# Patient Record
(demographics unavailable — no encounter records)

---

## 2019-12-09 NOTE — EDM.PDOC
ED HPI GENERAL MEDICAL PROBLEM





- General


Chief Complaint: General


Stated Complaint: dizzy


Time Seen by Provider: 12/09/19 07:58


Source of Information: Reports: Patient


History Limitations: Reports: No Limitations





- History of Present Illness


INITIAL COMMENTS - FREE TEXT/NARRATIVE: 





This patient is a 65 year old female that presents to the ER. The patient 

reports that at 3am this morning she got up from bed to walk to the bathroom 

and became dizzy and fell to the floor. Patient denies hitting her head, loc, n

, v, vision changes or injury from fall. Patient reports she laid there for 

about 15 minutes then got up to go to kitchen to get a drink of water. She 

reports then becoming dizzy again. She reports she did not fall at that time. 

Patient reports that her dizziness is worse with movement or when she gets up. 

She reports it feels like the room or her are spinning. The patient also 

reports that she only takes multivitamins. She reports no hx of diabetes or 

cardiac history. She reports that she did get a mammogram in April 2019 in 

[place] where she was living. She reports that the Mammogram in April showed a 

mass in her right breast that needed surgery. The patient reports she did not 

want to have surgery and wanted a second opinion. She reports in August having 

another mammogram and again showed Mass in right breast in [place]. Patient 

reports she still wanted to have a second opinion. She now lives in [place]. She 

reports that she is waiting for MRI and appointment in [place] for this breast 

mass. She reports they are waiting on medical records from [place] at [facility]. The patient also reports now for several weeks, maybe months having 

a dry cough. She reports for weeks her lungs have hurt, hurt worse with 

coughing. The patient is a CNA. The patient is alert and oriented. She has no 

unilateral weaknesses. 


Onset: Today


Onset Date: 12/09/19


Onset Time: 03:30


Severity: Moderate


Worsens with: Reports: Movement


Associated Symptoms: Reports: Cough, Malaise, Weakness (generally).  Denies: 

Confusion, Chest Pain, cough w sputum, Diaphoresis, Fever/Chills, Headaches, 

Loss of Appetite, Nausea/Vomiting, Rash, Seizure, Shortness of Breath, Syncope


  ** Chest


Pain Score (Numeric/FACES): 6





- Related Data


 Allergies











Allergy/AdvReac Type Severity Reaction Status Date / Time


 


Penicillins Allergy  Cannot Verified 12/09/19 08:03





   Remember  











Home Meds: 


 Home Meds





Multivitamin [Multi-Vitamin Daily] 1 tab PO DAILY 12/09/19 [History]


Omega-3S/DHA/Epa/Fish Oil [Fish Oil Omega-3 Softgel] 1 cap PO DAILY 12/09/19 [

History]











ED ROS GENERAL





- Review of Systems


Review Of Systems: See Below


Constitutional: Reports: Malaise, Weakness (generally)


HEENT: Reports: No Symptoms.  Denies: Rhinitis, Sinus Problem


Respiratory: Reports: Pleuritic Chest Pain ("lungs hurt"), Cough.  Denies: 

Shortness of Breath, Sputum


Cardiovascular: Denies: Edema, Palpitations, Syncope


Endocrine: Reports: No Symptoms


GI/Abdominal: Denies: Abdominal Pain, Black Stool, Bloody Stool, Constipation, 

Diarrhea, Distension, Nausea, Vomiting


: Reports: No Symptoms.  Denies: Dysuria, Flank Pain


Musculoskeletal: Reports: No Symptoms


Skin: Reports: No Symptoms


Neurological: Reports: Dizziness.  Denies: Confusion, Headache, Numbness, 

Tingling, Tremors, Trouble Speaking, Weakness (no unilateral weaknesses), 

Change in Speech


Psychiatric: Reports: Anxiety ("I have a lot going on")


Hematologic/Lymphatic: Reports: No Symptoms


Immunologic: Reports: No Symptoms





ED EXAM, GENERAL





- Physical Exam


Exam: See Below


Exam Limited By: No Limitations


General Appearance: Alert, WD/WN, No Apparent Distress, Anxious, Obese


Eye Exam: Bilateral Eye: EOMI, Normal Inspection, PERRL


Ears: Normal External Exam, Normal Canal, Hearing Grossly Normal, Normal TMs


Ear Exam: Bilateral Ear: Auricle Normal, Canal Normal, TM normal


Nose: Normal Inspection, Normal Mucosa, No Blood


Throat/Mouth: Normal Inspection, Normal Lips, Normal Teeth, Normal Gums, Normal 

Oropharynx, Normal Voice, No Airway Compromise


Head: Atraumatic, Normocephalic


Neck: Normal Inspection, Supple, Non-Tender, Full Range of Motion


Respiratory/Chest: No Respiratory Distress, Lungs Clear, Normal Breath Sounds, 

No Accessory Muscle Use, Chest Non-Tender


Cardiovascular: Normal Peripheral Pulses, No Edema, No Gallop, No JVD, No Murmur

, No Rub, Tachycardia (90 when laying down, then went to 104 on exam when 

sitting up. Patient did become symptomatic dizzy with sitting up. )


Peripheral Pulses: 2+: Radial (L), Radial (R), Posterior Tibial (L), Posterior 

Tibial (R)


GI/Abdominal: Normal Bowel Sounds, Soft, Non-Tender, No Organomegaly, No 

Distention, No Abnormal Bruit, No Mass, Pelvis Stable


 (Female) Exam: Deferred


Rectal (Female) Exam: Deferred


Back Exam: Normal Inspection, Full Range of Motion


Extremities: Normal Inspection, Normal Range of Motion, Non-Tender, No Pedal 

Edema, Normal Capillary Refill


Neurological: Alert, Oriented, CN II-XII Intact, Normal Cognition, No Motor/

Sensory Deficits, Other (STROKE SCORE:0     GCS:15)


Psychiatric: Anxious, Tearful


Skin Exam: Warm, Dry, Intact, Normal Color, No Rash


Lymphatic: No Adenopathy





Course





- Vital Signs


Last Recorded V/S: 


 Last Vital Signs











Temp  97.9 F   12/09/19 07:55


 


Pulse  101 H  12/09/19 07:55


 


Resp  16   12/09/19 07:55


 


BP  130/81   12/09/19 07:55


 


Pulse Ox  97   12/09/19 07:55








 





Orthostatic Blood Pressure [     131/91


Standing]                        


Orthostatic Blood Pressure [     152/103


Sitting]                         


Orthostatic Blood Pressure [     134/77


Supine]                          











- Orders/Labs/Meds


Orders: 


 Active Orders 24 hr











 Category Date Time Status


 


 EKG Documentation Completion [RC] STAT Care  12/09/19 08:09 Active


 


 Orthostatic Vital Signs [RC] ASDIRECTED Care  12/09/19 08:07 Active


 


 Chest 2V [CR] Stat Exams  12/09/19 08:07 Taken


 


 CULTURE BLOOD [BC] Stat Lab  12/09/19 08:15 Received


 


 CULTURE BLOOD [BC] Stat Lab  12/09/19 08:32 Received


 


 CULTURE URINE [RM] Stat Lab  12/09/19 08:50 Received


 


 Blood Culture x2 Reflex Set [OM.PC] Stat Oth  12/09/19 08:07 Ordered











Labs: 


 Laboratory Tests











  12/09/19 12/09/19 12/09/19 Range/Units





  08:15 08:15 08:15 


 


WBC  8.4    (5.0-10.0)  10^3/uL


 


RBC  4.57    (4.00-5.50)  10^6/uL


 


Hgb  14.2    (12.0-16.0)  g/dL


 


Hct  41.5    (37.0-47.0)  %


 


MCV  90.8    (82.0-94.0)  fL


 


MCH  31.1    (27.0-32.0)  pg


 


MCHC  34.2    (33.0-38.0)  g/dL


 


RDW Coeff of Madisyn  12.6    (11.0-15.0)  %


 


Plt Count  247    (150-400)  10^3/uL


 


Neut % (Auto)  67.9    (35-85)  %


 


Lymph % (Auto)  18.1    (10-55)  %


 


Mono % (Auto)  10.0    (0-16)  %


 


Eos % (Auto)  3.2    (0-5)  %


 


Baso % (Auto)  0.8    (0-3)  %


 


Neut # (Auto)  5.68    (1.80-7.00)  10^3/uL


 


Lymph # (Auto)  1.52    (1.00-4.80)  10^3/uL


 


Mono # (Auto)  0.84 H    (0.00-0.80)  10^3/uL


 


Eos # (Auto)  0.27    (0.00-0.45)  10^3/uL


 


Baso # (Auto)  0.07    10^3/uL


 


D-Dimer, Quantitative     (0.00-0.50)  


 


Sodium   141   (136-145)  mEq/L


 


Potassium   4.1   (3.5-5.0)  mEq/L


 


Chloride   106   ()  mEq/L


 


Carbon Dioxide   23   (21-32)  mmol/L


 


BUN   11   (7-18)  mg/dL


 


Creatinine   0.9   (0.6-1.0)  mg/dL


 


Est Cr Clr Drug Dosing   47.03   mL/min


 


Estimated GFR (MDRD)   > 60   (>=60)  mL/min


 


Glucose   207 H   (75-99)  mg/dL


 


Lactic Acid    1.7  (0.4-2.0)  mmol/L


 


Calcium   9.1   (8.4-10.1)  mg/dL


 


Total Bilirubin   0.5   (0.0-1.0)  mg/dL


 


AST   42 H   (15-37)  U/L


 


ALT   107 H   (12-78)  U/L


 


Alkaline Phosphatase   163 H   ()  U/L


 


Creatine Kinase   73   ()  U/L


 


Troponin I   < 0.017   (0.00-0.06)  ng/mL


 


Total Protein   7.3   (6.4-8.2)  g/dL


 


Albumin   3.8   (3.4-5.0)  g/dL


 


Urine Color     (YELLOW)  


 


Urine Appearance     (CLEAR)  


 


Urine pH     (4.5-8.0)  


 


Ur Specific Gravity     (1.003-1.020)  


 


Urine Protein     (NEGATIVE)  mg/dL


 


Urine Glucose (UA)     (NEGATIVE)  mg/dL


 


Urine Ketones     (NEGATIVE)  mg/dL


 


Urine Occult Blood     (NEGATIVE)  


 


Urine Nitrite     (NEGATIVE)  


 


Urine Bilirubin     (NEGATIVE)  


 


Urine Urobilinogen     (0.2-1.0)  EU/dL


 


Ur Leukocyte Esterase     (NEGATIVE)  


 


Urine RBC     


 


Urine Red Cell Clumps     


 


Urine WBC     


 


Urine WBC Clumps     


 


Ur Epithelial Cells     


 


Ur Squamous Epith Cells     


 


Ur Renal Epithelial Cell     


 


Wilfredo Biurate Crystals     


 


Calcium Oxalate Crystal     


 


Uric Acid Crystals     


 


Triple Phos Crystals     


 


Other Crystals     


 


Amorphous Sediment     


 


Urine Bacteria     


 


Hyaline Casts     


 


Granular Casts     


 


RBC Casts     


 


WBC Casts     


 


Urine Mucus     


 


Urine Other     


 


Urine Trichomonas     


 


Urine Yeast     


 


Urine Sperm     


 


Urinalysis Comment     














  12/09/19 12/09/19 Range/Units





  08:15 08:50 


 


WBC    (5.0-10.0)  10^3/uL


 


RBC    (4.00-5.50)  10^6/uL


 


Hgb    (12.0-16.0)  g/dL


 


Hct    (37.0-47.0)  %


 


MCV    (82.0-94.0)  fL


 


MCH    (27.0-32.0)  pg


 


MCHC    (33.0-38.0)  g/dL


 


RDW Coeff of Madisyn    (11.0-15.0)  %


 


Plt Count    (150-400)  10^3/uL


 


Neut % (Auto)    (35-85)  %


 


Lymph % (Auto)    (10-55)  %


 


Mono % (Auto)    (0-16)  %


 


Eos % (Auto)    (0-5)  %


 


Baso % (Auto)    (0-3)  %


 


Neut # (Auto)    (1.80-7.00)  10^3/uL


 


Lymph # (Auto)    (1.00-4.80)  10^3/uL


 


Mono # (Auto)    (0.00-0.80)  10^3/uL


 


Eos # (Auto)    (0.00-0.45)  10^3/uL


 


Baso # (Auto)    10^3/uL


 


D-Dimer, Quantitative  0.22   (0.00-0.50)  


 


Sodium    (136-145)  mEq/L


 


Potassium    (3.5-5.0)  mEq/L


 


Chloride    ()  mEq/L


 


Carbon Dioxide    (21-32)  mmol/L


 


BUN    (7-18)  mg/dL


 


Creatinine    (0.6-1.0)  mg/dL


 


Est Cr Clr Drug Dosing    mL/min


 


Estimated GFR (MDRD)    (>=60)  mL/min


 


Glucose    (75-99)  mg/dL


 


Lactic Acid    (0.4-2.0)  mmol/L


 


Calcium    (8.4-10.1)  mg/dL


 


Total Bilirubin    (0.0-1.0)  mg/dL


 


AST    (15-37)  U/L


 


ALT    (12-78)  U/L


 


Alkaline Phosphatase    ()  U/L


 


Creatine Kinase    ()  U/L


 


Troponin I    (0.00-0.06)  ng/mL


 


Total Protein    (6.4-8.2)  g/dL


 


Albumin    (3.4-5.0)  g/dL


 


Urine Color   Light yellow  (YELLOW)  


 


Urine Appearance   Slightly cloudy  (CLEAR)  


 


Urine pH   5.5  (4.5-8.0)  


 


Ur Specific Gravity   1.015  (1.003-1.020)  


 


Urine Protein   Negative  (NEGATIVE)  mg/dL


 


Urine Glucose (UA)   Negative  (NEGATIVE)  mg/dL


 


Urine Ketones   Negative  (NEGATIVE)  mg/dL


 


Urine Occult Blood   Trace-lysed H  (NEGATIVE)  


 


Urine Nitrite   Negative  (NEGATIVE)  


 


Urine Bilirubin   Negative  (NEGATIVE)  


 


Urine Urobilinogen   0.2  (0.2-1.0)  EU/dL


 


Ur Leukocyte Esterase   Large H  (NEGATIVE)  


 


Urine RBC   Cancelled  


 


Urine Red Cell Clumps   Cancelled  


 


Urine WBC   Cancelled  


 


Urine WBC Clumps   Cancelled  


 


Ur Epithelial Cells   Cancelled  


 


Ur Squamous Epith Cells   Cancelled  


 


Ur Renal Epithelial Cell   Cancelled  


 


[place] Biurate Crystals   Cancelled  


 


Calcium Oxalate Crystal   Cancelled  


 


Uric Acid Crystals   Cancelled  


 


Triple Phos Crystals   Cancelled  


 


Other Crystals   Cancelled  


 


Amorphous Sediment   Cancelled  


 


Urine Bacteria   Cancelled  


 


Hyaline Casts   Cancelled  


 


Granular Casts   Cancelled  


 


RBC Casts   Cancelled  


 


WBC Casts   Cancelled  


 


Urine Mucus   Cancelled  


 


Urine Other   Cancelled  


 


Urine Trichomonas   Cancelled  


 


Urine Yeast   Cancelled  


 


Urine Sperm   Cancelled  


 


Urinalysis Comment   Cancelled  











Meds: 


Medications














Discontinued Medications














Generic Name Dose Route Start Last Admin





  Trade Name Freq  PRN Reason Stop Dose Admin


 


Sodium Chloride  1,000 mls @ 1,000 mls/hr  12/09/19 08:12  12/09/19 08:35





  Normal Saline  IV  12/09/19 09:11  1,000 mls/hr





  .BOLUS ONE   Administration





     





     





     





     


 


Meclizine HCl  25 mg  12/09/19 09:44  12/09/19 09:47





  Antivert  PO  12/09/19 09:45  25 mg





  ONETIME ONE   Administration





     





     





     





     














- Radiology Interpretation


Free Text/Narrative:: 





CXR: No infiltrates, no pulmonary edema, no cardiomegaly.





- Re-Assessments/Exams


Free Text/Narrative Re-Assessment/Exam: 





12/09/19 19:17


I spoke with patient and her diagnosis of BPH vertigo. I attempted Eply 

maneuver in the ER. Patient did have increase in vertigo and nystagmus with 

maneuver initially left greater than right. Then, it did improve, but not 

resolved completely. The patient is still having some dizziness with positional 

changes. THe patient daughter is over 4 hours away and can not  the 

patient today. The patient lives at home alone. I do not feel the patient is 

safe to go home by herself with dizziness and risk for falls today. I will 

admit this patient. Discussed patient with PCP Noah ORTIZ. Her BS and liver 

enzymes are also elevated. They are currently working with insurance on MRI 

approval. Will admit.  





Departure





- Departure


Time of Disposition: 10:23


Disposition: Refer to Observation


Condition: Fair


Clinical Impression: 


 Vertigo, Near syncope, General weakness





Fall


Qualifiers:


 Encounter type: initial encounter Qualified Code(s): W19.XXXA - Unspecified 

fall, initial encounter








- Discharge Information


*PRESCRIPTION DRUG MONITORING PROGRAM REVIEWED*: Not Applicable


*COPY OF PRESCRIPTION DRUG MONITORING REPORT IN PATIENT [facility]: Not Applicable


Referrals: 


Carrie Zamora PA [Primary Care Provider] - 


Forms:  ED Department Discharge





- My Orders


Last 24 Hours: 


My Active Orders





12/09/19 08:07


Orthostatic Vital Signs [RC] ASDIRECTED 


Chest 2V [CR] Stat 


Blood Culture x2 Reflex Set [OM.PC] Stat 





12/09/19 08:09


EKG Documentation Completion [RC] STAT 





12/09/19 08:15


CULTURE BLOOD [BC] Stat 





12/09/19 08:32


CULTURE BLOOD [BC] Stat 





12/09/19 08:50


CULTURE URINE [RM] Stat 














- Assessment/Plan


Last 24 Hours: 


My Active Orders





12/09/19 08:07


Orthostatic Vital Signs [RC] ASDIRECTED 


Chest 2V [CR] Stat 


Blood Culture x2 Reflex Set [OM.PC] Stat 





12/09/19 08:09


EKG Documentation Completion [RC] STAT 





12/09/19 08:15


CULTURE BLOOD [BC] Stat 





12/09/19 08:32


CULTURE BLOOD [BC] Stat 





12/09/19 08:50


CULTURE URINE [RM] Stat 











Plan: 





PLEASE SEE RN NOTE FOR PFSH.


PLEASE USE ER H&P AS ADMIT H&P.

## 2019-12-10 NOTE — [id number]
**Discharge Summary





- Hospital Course


Free Text/Narrative:: 


Shirley is a 65 year old female that presented to the ER with complaints of 

dizziness.  States she had gotten up to go to the bathroom and had suddent 

onset of dizziness and fell to floor.  Did not sustain any injury.  No nausea.  

Did lie on the floor for some time and still felt dizzy when getting up.  

Admits that the room spins with head movement, especially with turning over in 

bed.  No CVA symptoms, ie. weakness, neurological changes.  No known cardiac 

history.  Has been waiting on a MRI of her breast due to abnormal mammogram 

when living in [place].  Recommended MRI to better clarify calcification.  

Patient is waiting on CDs of the actual films for [facility] to 

review.  Labs in ER did show elevated blood sugar, negative cardiac work up.  

Admitted for further monitoring.  PT for canalith repositioning.  IV fluids.


Diagnosis: Stroke: No


Modified [place] Scale: No Symptoms at All


Modified [place] Scale Score: 0





- Discharge Data


Discharge Date: 12/10/19


Discharge Disposition: Home, Self-Care 01


Condition: Good





- Referral to Home Health


Primary Care Physician: 


ANGEL Cuevas








- Patient Summary/Data


Complications: none


Consults: 


 Consultations





12/09/19 10:36


PT Evaluation and Treatment [CONS] Routine 











Hospital Course: 





Patient is improved however does continue to have dizziness with turning over 

in bed.  Is up and ambulatory in room and tolerates well.  Had canalith 

repositioning by PT yesterday.  BLood pressure stable.  Blood sugar remained 

elevated this am so A1C was drawn and is high at 7.9%.  Patient has not been 

previously diagnosed with diabetes but reports blood sugars have been elevated 

at times when checked in the past.  She is due for full health maintenance 

exam.  Started on Meclizine for dizziness.  Metformin 500 mg daily, advised may 

develop mild GI symptoms when first starting.  Will have patient meet with 

dietitian/diabetic educator as an outpatient.  Set up physical in [place] in early 

January.  





- Patient Instructions


Diet: Usual Diet as Tolerated


Activity: As Tolerated


Other/Special Instructions: Dietitian consult





- Discharge Plan


*PRESCRIPTION DRUG MONITORING PROGRAM REVIEWED*: Not Applicable


*COPY OF PRESCRIPTION DRUG MONITORING REPORT IN PATIENT [facility]: Not Applicable


Prescriptions/Med Rec: 


Meclizine [Antivert] 25 mg PO TID #30 tab


metFORMIN [Glucophage XR] 500 mg PO DAILY #30 tab.er


Home Medications: 


 Home Meds





Multivitamin [Multi-Vitamin Daily] 1 tab PO DAILY 12/09/19 [History]


Omega-3S/DHA/Epa/Fish Oil [Fish Oil Omega-3 Softgel] 1 cap PO DAILY 12/09/19 [

History]


Meclizine [Antivert] 25 mg PO TID #30 tab 12/10/19 [Rx]


metFORMIN [Glucophage XR] 500 mg PO DAILY #30 tab.er 12/10/19 [Rx]








Forms:  ED Department Discharge


Referrals: 


Carrie Zamora PA [Primary Care Provider] -  (Follow up with a physical 

in January in [place])





- Discharge Summary/Plan Comment


DC Time >30 min.: No





- General Info


Date of Service: 12/10/19


Admission Dx/Problem (Free Text: 





Vertigo


Near Syncope





Functional Status: Reports: Pain Controlled, Tolerating Diet, Ambulating





- Review of Systems


General: Denies: Fever, Weakness, Fatigue


HEENT: Denies: Ear Pain, Headaches, Visual Changes


Pulmonary: Denies: Shortness of Breath, Cough


Cardiovascular: Denies: Chest Pain, Edema, Lightheadedness


Gastrointestinal: Denies: Abdominal Pain, Nausea, Vomiting


Genitourinary: Reports: No Symptoms


Musculoskeletal: Reports: No Symptoms


Skin: Reports: No Symptoms


Neurological: Reports: Dizziness





- Patient Data


Vitals - Most Recent: 


 Last Vital Signs











Temp  98.1 F   12/10/19 07:41


 


Pulse  86   12/10/19 07:41


 


Resp  16   12/10/19 07:41


 


BP  129/65   12/10/19 07:41


 


Pulse Ox  98   12/10/19 07:41








 





Orthostatic Blood Pressure [     131/91


Standing]                        


Orthostatic Blood Pressure [     152/103


Sitting]                         


Orthostatic Blood Pressure [     134/77


Supine]                          








Weight - Most Recent: 158 lb 12.8 oz


Lab Results - Last 24 hrs: 


 Laboratory Results - last 24 hr











  12/09/19 12/10/19 12/10/19 Range/Units





  08:50 05:11 07:00 


 


WBC    7.6  (5.0-10.0)  10^3/uL


 


RBC    4.22  (4.00-5.50)  10^6/uL


 


Hgb    13.1  (12.0-16.0)  g/dL


 


Hct    38.7  (37.0-47.0)  %


 


MCV    91.7  (82.0-94.0)  fL


 


MCH    31.0  (27.0-32.0)  pg


 


MCHC    33.9  (33.0-38.0)  g/dL


 


RDW Coeff of Madisyn    12.4  (11.0-15.0)  %


 


Plt Count    223  (150-400)  10^3/uL


 


Neut % (Auto)    57.7  (35-85)  %


 


Lymph % (Auto)    25.7  (10-55)  %


 


Mono % (Auto)    10.9  (0-16)  %


 


Eos % (Auto)    5.0  (0-5)  %


 


Baso % (Auto)    0.7  (0-3)  %


 


Neut # (Auto)    4.42  (1.80-7.00)  10^3/uL


 


Lymph # (Auto)    1.96  (1.00-4.80)  10^3/uL


 


Mono # (Auto)    0.83 H  (0.00-0.80)  10^3/uL


 


Eos # (Auto)    0.38  (0.00-0.45)  10^3/uL


 


Baso # (Auto)    0.05  10^3/uL


 


Sodium   143   (136-145)  mEq/L


 


Potassium   4.2   (3.5-5.0)  mEq/L


 


Chloride   109 H   ()  mEq/L


 


Carbon Dioxide   25   (21-32)  mmol/L


 


BUN   10   (7-18)  mg/dL


 


Creatinine   0.9   (0.6-1.0)  mg/dL


 


Est Cr Clr Drug Dosing   47.03   mL/min


 


Estimated GFR (MDRD)   > 60   (>=60)  mL/min


 


Glucose   153 H D   (75-99)  mg/dL


 


Hemoglobin A1c     (4.8-6.2)  %


 


Calcium   8.5   (8.4-10.1)  mg/dL


 


Total Bilirubin   0.6   (0.0-1.0)  mg/dL


 


AST   36   (15-37)  U/L


 


ALT   85 H   (12-78)  U/L


 


Alkaline Phosphatase   130 H   ()  U/L


 


Total Protein   6.2 L   (6.4-8.2)  g/dL


 


Albumin   3.0 L   (3.4-5.0)  g/dL


 


Urine Color  Cancelled    


 


Urine Appearance  Cancelled    


 


Urine pH  Cancelled    


 


Ur Specific Gravity  Cancelled    


 


Urine Protein  Cancelled    


 


Urine Glucose (UA)  Cancelled    


 


Urine Ketones  Cancelled    


 


Urine Occult Blood  Cancelled    


 


Urine Nitrite  Cancelled    


 


Urine Bilirubin  Cancelled    


 


Urine Urobilinogen  Cancelled    


 


Ur Leukocyte Esterase  Cancelled    


 


Urine RBC  Cancelled    


 


Urine Red Cell Clumps  Cancelled    


 


Urine WBC  Cancelled    


 


Urine WBC Clumps  Cancelled    


 


Ur Epithelial Cells  Cancelled    


 


Ur Squamous Epith Cells  Cancelled    


 


Ur Renal Epithelial Cell  Cancelled    


 


Glacier Colony Biurate Crystals  Cancelled    


 


Calcium Oxalate Crystal  Cancelled    


 


Uric Acid Crystals  Cancelled    


 


Triple Phos Crystals  Cancelled    


 


Other Crystals  Cancelled    


 


Amorphous Sediment  Cancelled    


 


Urine Bacteria  Cancelled    


 


Hyaline Casts  Cancelled    


 


Granular Casts  Cancelled    


 


RBC Casts  Cancelled    


 


WBC Casts  Cancelled    


 


Urine Mucus  Cancelled    


 


Urine Other  Cancelled    


 


Urine Trichomonas  Cancelled    


 


Urine Yeast  Cancelled    


 


Urine Sperm  Cancelled    


 


Urinalysis Comment  Cancelled    














  12/10/19 Range/Units





  07:00 


 


WBC   (5.0-10.0)  10^3/uL


 


RBC   (4.00-5.50)  10^6/uL


 


Hgb   (12.0-16.0)  g/dL


 


Hct   (37.0-47.0)  %


 


MCV   (82.0-94.0)  fL


 


MCH   (27.0-32.0)  pg


 


MCHC   (33.0-38.0)  g/dL


 


RDW Coeff of Madisyn   (11.0-15.0)  %


 


Plt Count   (150-400)  10^3/uL


 


Neut % (Auto)   (35-85)  %


 


Lymph % (Auto)   (10-55)  %


 


Mono % (Auto)   (0-16)  %


 


Eos % (Auto)   (0-5)  %


 


Baso % (Auto)   (0-3)  %


 


Neut # (Auto)   (1.80-7.00)  10^3/uL


 


Lymph # (Auto)   (1.00-4.80)  10^3/uL


 


Mono # (Auto)   (0.00-0.80)  10^3/uL


 


Eos # (Auto)   (0.00-0.45)  10^3/uL


 


Baso # (Auto)   10^3/uL


 


Sodium   (136-145)  mEq/L


 


Potassium   (3.5-5.0)  mEq/L


 


Chloride   ()  mEq/L


 


Carbon Dioxide   (21-32)  mmol/L


 


BUN   (7-18)  mg/dL


 


Creatinine   (0.6-1.0)  mg/dL


 


Est Cr Clr Drug Dosing   mL/min


 


Estimated GFR (MDRD)   (>=60)  mL/min


 


Glucose   (75-99)  mg/dL


 


Hemoglobin A1c  7.9 H  (4.8-6.2)  %


 


Calcium   (8.4-10.1)  mg/dL


 


Total Bilirubin   (0.0-1.0)  mg/dL


 


AST   (15-37)  U/L


 


ALT   (12-78)  U/L


 


Alkaline Phosphatase   ()  U/L


 


Total Protein   (6.4-8.2)  g/dL


 


Albumin   (3.4-5.0)  g/dL


 


Urine Color   


 


Urine Appearance   


 


Urine pH   


 


Ur Specific Gravity   


 


Urine Protein   


 


Urine Glucose (UA)   


 


Urine Ketones   


 


Urine Occult Blood   


 


Urine Nitrite   


 


Urine Bilirubin   


 


Urine Urobilinogen   


 


Ur Leukocyte Esterase   


 


Urine RBC   


 


Urine Red Cell Clumps   


 


Urine WBC   


 


Urine WBC Clumps   


 


Ur Epithelial Cells   


 


Ur Squamous Epith Cells   


 


Ur Renal Epithelial Cell   


 


Glacier Colony Biurate Crystals   


 


Calcium Oxalate Crystal   


 


Uric Acid Crystals   


 


Triple Phos Crystals   


 


Other Crystals   


 


Amorphous Sediment   


 


Urine Bacteria   


 


Hyaline Casts   


 


Granular Casts   


 


RBC Casts   


 


WBC Casts   


 


Urine Mucus   


 


Urine Other   


 


Urine Trichomonas   


 


Urine Yeast   


 


Urine Sperm   


 


Urinalysis Comment   











SALLY Results - Last 24 hrs: 


 Microbiology











 12/09/19 08:32 Aerobic Blood Culture - Preliminary





 Blood - Venous - Lab Draw    NO GROWTH AFTER 1 DAY





 Anaerobic Blood Culture - Preliminary





    NO GROWTH AFTER 1 DAY


 


 12/09/19 08:15 Aerobic Blood Culture - Preliminary





 Blood - Venous    NO GROWTH AFTER 1 DAY





 Anaerobic Blood Culture - Preliminary





    NO GROWTH AFTER 1 DAY











Med Orders - Current: 


 Current Medications








Discontinued Medications





Enoxaparin Sodium (Lovenox)  40 mg SUBCUT Q24H [facility]


   Last Admin: 12/09/19 13:51 Dose:  40 mg


Sodium Chloride (Normal Saline)  1,000 mls @ 1,000 mls/hr IV .BOLUS ONE


   Stop: 12/09/19 09:11


   Last Admin: 12/09/19 08:35 Dose:  1,000 mls/hr


Sodium Chloride (Normal Saline)  1,000 mls @ 125 mls/hr IV ASDIRECTED [facility]


   Stop: 12/09/19 21:00


   Last Admin: 12/09/19 18:51 Dose:  125 mls/hr


Meclizine HCl (Antivert)  25 mg PO ONETIME ONE


   Stop: 12/09/19 09:45


   Last Admin: 12/09/19 09:47 Dose:  25 mg


Multivitamins/Minerals/Vitamin C (Tab-A-Amy)  1 tab PO DAILY [facility]


   Last Admin: 12/10/19 07:37 Dose:  1 tab


Non-Formulary Medication (Omega-3s/Dha/Epa/Fish Oil [Fish Oil Omega-3 Softgel])

  1 cap PO DAILY [facility]


Ondansetron HCl (Zofran)  4 mg IV Q6H PRN


   PRN Reason: Nausea/Vomiting











- Exam


General: Reports: Alert, Oriented


HEENT: Reports: Mucous Membr. Moist/Pink


Neck: Reports: Supple


Lungs: Reports: Clear to Auscultation, Normal Respiratory Effort


Cardiovascular: Reports: Regular Rate, Regular Rhythm


GI/Abdominal Exam: Normal Bowel Sounds, Soft, Non-Tender


Extremities: Normal Inspection, No Pedal Edema


Skin: Reports: Warm, Dry


Neurological: Reports: No New Focal Deficit